# Patient Record
Sex: FEMALE | Race: WHITE | URBAN - METROPOLITAN AREA
[De-identification: names, ages, dates, MRNs, and addresses within clinical notes are randomized per-mention and may not be internally consistent; named-entity substitution may affect disease eponyms.]

---

## 2017-08-31 ENCOUNTER — OFFICE VISIT (OUTPATIENT)
Dept: FAMILY MEDICINE CLINIC | Facility: CLINIC | Age: 62
End: 2017-08-31

## 2017-08-31 VITALS
TEMPERATURE: 98 F | DIASTOLIC BLOOD PRESSURE: 90 MMHG | BODY MASS INDEX: 28.95 KG/M2 | SYSTOLIC BLOOD PRESSURE: 148 MMHG | RESPIRATION RATE: 16 BRPM | HEART RATE: 74 BPM | HEIGHT: 66.5 IN | WEIGHT: 182.25 LBS

## 2017-08-31 DIAGNOSIS — Z00.00 HEALTH CARE MAINTENANCE: Primary | ICD-10-CM

## 2017-08-31 DIAGNOSIS — E78.5 HYPERLIPIDEMIA, UNSPECIFIED HYPERLIPIDEMIA TYPE: ICD-10-CM

## 2017-08-31 PROBLEM — E78.49 OTHER HYPERLIPIDEMIA: Status: ACTIVE | Noted: 2017-08-31

## 2017-08-31 PROCEDURE — 99396 PREV VISIT EST AGE 40-64: CPT | Performed by: FAMILY MEDICINE

## 2017-08-31 RX ORDER — CLOBETASOL PROPIONATE 0.5 MG/G
OINTMENT TOPICAL AS NEEDED
COMMUNITY
Start: 2016-12-12 | End: 2019-05-20

## 2017-08-31 RX ORDER — VALACYCLOVIR HYDROCHLORIDE 500 MG/1
1 TABLET, FILM COATED ORAL AS NEEDED
COMMUNITY
Start: 2007-12-20 | End: 2021-11-03

## 2017-08-31 RX ORDER — ATORVASTATIN CALCIUM 10 MG/1
10 TABLET, FILM COATED ORAL NIGHTLY
Qty: 30 TABLET | Refills: 2 | Status: SHIPPED | OUTPATIENT
Start: 2017-08-31 | End: 2017-11-03

## 2017-08-31 NOTE — PROGRESS NOTES
[facility] [place]  PROGRESS NOTE  Chief Complaint:   Patient presents with:  Physical      HPI:   This is a 59year old female coming in for annual health check. Overall the patient feels well. She has no specific complaints of.   Over the pas tablet Rfl: 2      Counseling given: Not Answered       REVIEW OF SYSTEMS:   CONSTITUTIONAL:  Denies unusual weight gain/loss,  EENT:  Eyes:  Denies eye pain, visual loss, blurred vision, double vision or yellow sclerae.  Ears, Nose, Throat:  Denies hearing CLAD, no JVD, no thyromegaly. SKIN: No rashes, no skin lesion, no bruising, good turgor. HEART:  Regular rate and rhythm, no murmurs, rubs or gallops. LUNGS: Clear to auscultation bilterally, no rales/rhonchi/wheezing. CHEST: No tenderness.   ABDOMEN: Other hyperlipidemia      Yazmin Rogers MD  8/31/2017  11:36 AM

## 2017-08-31 NOTE — PATIENT INSTRUCTIONS
Continue with healthy lifestyle and exercise. Will start atorvastatin 10 mg daily. Recheck fasting blood test in 2 months.

## 2017-11-02 ENCOUNTER — APPOINTMENT (OUTPATIENT)
Dept: LAB | Age: 62
End: 2017-11-02
Attending: FAMILY MEDICINE
Payer: COMMERCIAL

## 2017-11-02 DIAGNOSIS — E78.5 HYPERLIPIDEMIA, UNSPECIFIED HYPERLIPIDEMIA TYPE: ICD-10-CM

## 2017-11-02 PROCEDURE — 80061 LIPID PANEL: CPT | Performed by: FAMILY MEDICINE

## 2017-11-02 PROCEDURE — 80053 COMPREHEN METABOLIC PANEL: CPT | Performed by: FAMILY MEDICINE

## 2017-11-02 PROCEDURE — 36415 COLL VENOUS BLD VENIPUNCTURE: CPT | Performed by: FAMILY MEDICINE

## 2017-11-03 ENCOUNTER — TELEPHONE (OUTPATIENT)
Dept: FAMILY MEDICINE CLINIC | Facility: CLINIC | Age: 62
End: 2017-11-03

## 2017-11-03 RX ORDER — ATORVASTATIN CALCIUM 10 MG/1
10 TABLET, FILM COATED ORAL NIGHTLY
Qty: 90 TABLET | Refills: 1 | Status: SHIPPED | OUTPATIENT
Start: 2017-11-03 | End: 2017-11-22

## 2017-11-03 NOTE — TELEPHONE ENCOUNTER
Informed pt of her blood work results. Pt expressed understanding. OK for refill per Dr. Kaiser Quintanilla for 3 months. Pt will call back to schedule 6 month blood work.

## 2017-11-03 NOTE — TELEPHONE ENCOUNTER
----- Message from Patrica Barry MD sent at 11/3/2017  7:19 AM CDT -----  With the addition of atorvastatin 10 mg daily, cholesterol level has decreased nicely. Total cholesterol now is 206, was 295. LDL is 93, was 182.   With the LDL being lower than

## 2017-11-22 RX ORDER — ATORVASTATIN CALCIUM 10 MG/1
TABLET, FILM COATED ORAL
Qty: 90 TABLET | Refills: 1 | Status: SHIPPED | OUTPATIENT
Start: 2017-11-22 | End: 2018-05-30

## 2017-11-22 NOTE — TELEPHONE ENCOUNTER
Future appt:    Last Appointment:  8/31/2017    Cholesterol, Total (mg/dL)   Date Value   11/02/2017 206 (H)   ----------  HDL Cholesterol (mg/dL)   Date Value   11/02/2017 89   ----------  LDL Cholesterol (mg/dL)   Date Value   11/02/2017 93   ----------

## 2018-05-30 RX ORDER — ATORVASTATIN CALCIUM 10 MG/1
TABLET, FILM COATED ORAL
Qty: 90 TABLET | Refills: 0 | Status: SHIPPED | OUTPATIENT
Start: 2018-05-30 | End: 2018-05-31

## 2018-05-30 NOTE — TELEPHONE ENCOUNTER
Future appt:    Last Appointment:  8/31/17 with Dr Shaw Doss for physical and other health issues  Return in 1 year  11/22/17 med last refilled      Cholesterol, Total (mg/dL)   Date Value   11/02/2017 206 (H)   ----------  HDL Cholesterol (mg/dL)   Date Va

## 2018-05-31 ENCOUNTER — TELEPHONE (OUTPATIENT)
Dept: FAMILY MEDICINE CLINIC | Facility: CLINIC | Age: 63
End: 2018-05-31

## 2018-05-31 RX ORDER — ATORVASTATIN CALCIUM 10 MG/1
TABLET, FILM COATED ORAL
Qty: 90 TABLET | Refills: 0 | Status: SHIPPED | OUTPATIENT
Start: 2018-05-31 | End: 2018-09-05

## 2018-05-31 NOTE — TELEPHONE ENCOUNTER
Future appt:    Last Appointment:  Visit date not found    Cholesterol, Total (mg/dL)   Date Value   11/02/2017 206 (H)   ----------  HDL Cholesterol (mg/dL)   Date Value   11/02/2017 89   ----------  LDL Cholesterol (mg/dL)   Date Value   11/02/2017 93

## 2018-05-31 NOTE — TELEPHONE ENCOUNTER
RF  Atrovastatin  10mg     to   [facility]   ( no available RFs ) please call patient back

## 2018-06-04 ENCOUNTER — TELEPHONE (OUTPATIENT)
Dept: FAMILY MEDICINE CLINIC | Facility: CLINIC | Age: 63
End: 2018-06-04

## 2018-06-04 NOTE — TELEPHONE ENCOUNTER
Pt will have her blood work done in August.    Pt was wondering why she only go 30 tabs. I informed pt I don't know why we sent in a script for 90. Pt will call the pharmacy and her insurance to see why she only go 30 tabs.

## 2018-06-04 NOTE — TELEPHONE ENCOUNTER
Patient was seen August 31, 2017 for general health check. 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   If she is planning on having the same blood test done again this year through the school district, then she woul

## 2018-06-04 NOTE — TELEPHONE ENCOUNTER
Pt wants to know if she needs to schedule lab work in order to have prescription for Lipitor refilled.

## 2018-08-31 RX ORDER — ATORVASTATIN CALCIUM 10 MG/1
TABLET, FILM COATED ORAL
Qty: 90 TABLET | Refills: 0 | Status: SHIPPED | OUTPATIENT
Start: 2018-08-31 | End: 2019-05-07 | Stop reason: ALTCHOICE

## 2018-08-31 NOTE — TELEPHONE ENCOUNTER
Future appt:     Your appointments     Date & Time Appointment Department [facility])    Sep 05, 2018  3:00 PM CDT Physical - Established Patient with Gretta Story MD [address] [place] [address]        Nancy Sharma

## 2018-09-05 ENCOUNTER — OFFICE VISIT (OUTPATIENT)
Dept: FAMILY MEDICINE CLINIC | Facility: CLINIC | Age: 63
End: 2018-09-05
Payer: COMMERCIAL

## 2018-09-05 VITALS
RESPIRATION RATE: 18 BRPM | WEIGHT: 191.19 LBS | DIASTOLIC BLOOD PRESSURE: 84 MMHG | HEIGHT: 66.5 IN | BODY MASS INDEX: 30.36 KG/M2 | TEMPERATURE: 98 F | SYSTOLIC BLOOD PRESSURE: 148 MMHG | HEART RATE: 80 BPM

## 2018-09-05 DIAGNOSIS — Z00.00 HEALTH CARE MAINTENANCE: Primary | ICD-10-CM

## 2018-09-05 DIAGNOSIS — R73.9 HYPERGLYCEMIA: ICD-10-CM

## 2018-09-05 DIAGNOSIS — E78.5 HYPERLIPIDEMIA, UNSPECIFIED HYPERLIPIDEMIA TYPE: ICD-10-CM

## 2018-09-05 PROCEDURE — 99396 PREV VISIT EST AGE 40-64: CPT | Performed by: FAMILY MEDICINE

## 2018-09-05 NOTE — PROGRESS NOTES
[facility] [place]  PROGRESS NOTE  Chief Complaint:   Patient presents with:  Physical      HPI:   This is a 57year old female coming in for general health check. This patient was started on atorvastatin 10 mg last year for hyperlipidemia.   Ch have had a bad heart   • Hypertension Brother      Hyperlipidemia     Allergies:  No Known Allergies  Current Meds:    Current Outpatient Prescriptions:  ATORVASTATIN 10 MG Oral Tab TAKE 1 TABLET BY MOUTH EVERY NIGHT Disp: 90 tablet Rfl: 0   ValACYclovir H as calculated from the following:    Height as of this encounter: 66.5\". Weight as of this encounter: 191 lb 3.2 oz. Vital signs reviewed. Appears stated age, well groomed  Physical Exam:  GEN:  Patient is alert, awake and oriented, well developed, we Education: There are no barriers to learning. Medical education done. Outcome: Patient verbalizes understanding. Patient is notified to call with any questions, complications, allergies, or worsening or changing symptoms.   Patient is to call with any omari

## 2018-11-05 ENCOUNTER — APPOINTMENT (OUTPATIENT)
Dept: LAB | Age: 63
End: 2018-11-05
Attending: FAMILY MEDICINE
Payer: MEDICARE

## 2018-11-05 DIAGNOSIS — R73.9 HYPERGLYCEMIA: ICD-10-CM

## 2018-11-05 DIAGNOSIS — E78.5 HYPERLIPIDEMIA, UNSPECIFIED HYPERLIPIDEMIA TYPE: ICD-10-CM

## 2018-11-05 PROCEDURE — 83036 HEMOGLOBIN GLYCOSYLATED A1C: CPT

## 2018-11-05 PROCEDURE — 36415 COLL VENOUS BLD VENIPUNCTURE: CPT

## 2018-11-05 PROCEDURE — 80061 LIPID PANEL: CPT

## 2018-11-05 PROCEDURE — 80048 BASIC METABOLIC PNL TOTAL CA: CPT

## 2018-11-06 ENCOUNTER — TELEPHONE (OUTPATIENT)
Dept: FAMILY MEDICINE CLINIC | Facility: CLINIC | Age: 63
End: 2018-11-06

## 2018-11-06 NOTE — TELEPHONE ENCOUNTER
----- Message from Cholo Marsh MD sent at 11/6/2018  8:09 AM CST -----  Glucose is normal at 93 (was elevated on outside labs per) and A1c diabetic test is within normal range. This means there is no diabetes.   Since stopping atorvastatin 10 mg daily

## 2018-11-06 NOTE — TELEPHONE ENCOUNTER
Patient informed of below. Expressed understanding. Patient states Her Aches/Pain have subsided with stopping Atorvastin. Patient asking if there is an alternative statin she could try? She is dieting/exercising.   Sara Lambert, 11/06/18, 1:21 PM

## 2018-11-07 RX ORDER — EZETIMIBE 10 MG/1
10 TABLET ORAL NIGHTLY
Qty: 30 TABLET | Refills: 2 | Status: SHIPPED | OUTPATIENT
Start: 2018-11-07 | End: 2018-11-20

## 2018-11-07 NOTE — TELEPHONE ENCOUNTER
Tell her that I can prescribe Zetia 10 mg daily which is less effective than the statin group of medications and sometimes more expensive depending on insurance plans. Please send prescription for Zetia to pharmacy of choice and notify patient.   Then rech

## 2018-11-07 NOTE — TELEPHONE ENCOUNTER
Informed pt Dr. Ruth Reno is switching pt to Zetia. Pt will call if med is too expensive. Pt expressed understanding and thanks.

## 2018-11-20 NOTE — TELEPHONE ENCOUNTER
Patient requesting Rx for Zetia sent to Pharmacy. Leaving for [place]. Will be gone until April. Please send Zetia Rx to [address] [facility].   Kari Ventura, 11/20/18, 5:19 PM

## 2018-11-21 RX ORDER — EZETIMIBE 10 MG/1
10 TABLET ORAL NIGHTLY
Qty: 90 TABLET | Refills: 0 | Status: SHIPPED | OUTPATIENT
Start: 2018-11-21 | End: 2018-12-03

## 2018-12-03 RX ORDER — EZETIMIBE 10 MG/1
10 TABLET ORAL NIGHTLY
Qty: 90 TABLET | Refills: 0 | Status: SHIPPED | OUTPATIENT
Start: 2018-12-03 | End: 2019-04-05

## 2018-12-03 NOTE — TELEPHONE ENCOUNTER
Patient needs refill on her Ezetimibe 10mg medication to [address]. Hari Gann is leaving to [place] and won't be coming back until April. Wants to know if she can get refill until she comes back so she is able to refill at [place] without problems.  Pa

## 2018-12-03 NOTE — TELEPHONE ENCOUNTER
Patient leaving for [place]. Needs Zetia refilled to get her through April. Will get refills at [facility]. Patient is not having any side effects from Zetia. Has Appt scheduled with Dr Jax Peña in May. Please advise.   Efra River, 12/03/18, 11:23

## 2018-12-05 ENCOUNTER — TELEPHONE (OUTPATIENT)
Dept: FAMILY MEDICINE CLINIC | Facility: CLINIC | Age: 63
End: 2018-12-05

## 2018-12-05 NOTE — TELEPHONE ENCOUNTER
Tell patient that there is no other similar or alternative medication to ezetimibe (Zetia), other than statin medication which she did not tolerate due to myalgias.   In other words her insurance is implying using a statin medication, which she did not tole

## 2018-12-05 NOTE — TELEPHONE ENCOUNTER
Since pt is leaving for several month on Sunday. Pt will stay with the Zetia which is $25.00 per month. This med is working well for pt.

## 2019-04-05 RX ORDER — EZETIMIBE 10 MG/1
10 TABLET ORAL NIGHTLY
Qty: 90 TABLET | Refills: 0 | Status: SHIPPED | OUTPATIENT
Start: 2019-04-05 | End: 2019-05-20

## 2019-04-05 NOTE — TELEPHONE ENCOUNTER
Future Appointments   Date Time Provider Bebeto Aly   5/7/2019  9:00 AM Jcarlos Chambers MD [facility] [place]

## 2019-05-07 ENCOUNTER — HOSPITAL ENCOUNTER (OUTPATIENT)
Dept: BONE DENSITY | Age: 64
Discharge: HOME | End: 2019-05-07
Attending: FAMILY MEDICINE
Payer: MEDICARE

## 2019-05-07 ENCOUNTER — APPOINTMENT (OUTPATIENT)
Dept: LAB | Age: 64
End: 2019-05-07
Attending: FAMILY MEDICINE
Payer: MEDICARE

## 2019-05-07 ENCOUNTER — TELEPHONE (OUTPATIENT)
Dept: FAMILY MEDICINE CLINIC | Facility: CLINIC | Age: 64
End: 2019-05-07

## 2019-05-07 ENCOUNTER — OFFICE VISIT (OUTPATIENT)
Dept: FAMILY MEDICINE CLINIC | Facility: CLINIC | Age: 64
End: 2019-05-07
Payer: MEDICARE

## 2019-05-07 VITALS
DIASTOLIC BLOOD PRESSURE: 66 MMHG | TEMPERATURE: 98 F | HEART RATE: 78 BPM | WEIGHT: 192 LBS | OXYGEN SATURATION: 98 % | RESPIRATION RATE: 16 BRPM | BODY MASS INDEX: 29.78 KG/M2 | SYSTOLIC BLOOD PRESSURE: 134 MMHG | HEIGHT: 67.5 IN

## 2019-05-07 DIAGNOSIS — Z86.39 PERSONAL HISTORY OF ENDOCRINE DISORDER: ICD-10-CM

## 2019-05-07 DIAGNOSIS — L30.9 DERMATITIS: ICD-10-CM

## 2019-05-07 DIAGNOSIS — E78.49 OTHER HYPERLIPIDEMIA: ICD-10-CM

## 2019-05-07 DIAGNOSIS — B00.9 HERPES SIMPLEX: ICD-10-CM

## 2019-05-07 DIAGNOSIS — R73.09 ELEVATED GLUCOSE: ICD-10-CM

## 2019-05-07 DIAGNOSIS — Z00.00 ENCOUNTER FOR ANNUAL HEALTH EXAMINATION: Primary | ICD-10-CM

## 2019-05-07 DIAGNOSIS — Z78.0 ASYMPTOMATIC POSTMENOPAUSAL STATUS (AGE-RELATED) (NATURAL): ICD-10-CM

## 2019-05-07 DIAGNOSIS — Z23 NEED FOR VACCINATION: ICD-10-CM

## 2019-05-07 DIAGNOSIS — Z11.59 NEED FOR HEPATITIS C SCREENING TEST: ICD-10-CM

## 2019-05-07 PROCEDURE — G0403 EKG FOR INITIAL PREVENT EXAM: HCPCS | Performed by: FAMILY MEDICINE

## 2019-05-07 PROCEDURE — 85025 COMPLETE CBC W/AUTO DIFF WBC: CPT | Performed by: FAMILY MEDICINE

## 2019-05-07 PROCEDURE — 83036 HEMOGLOBIN GLYCOSYLATED A1C: CPT | Performed by: FAMILY MEDICINE

## 2019-05-07 PROCEDURE — 81003 URINALYSIS AUTO W/O SCOPE: CPT | Performed by: FAMILY MEDICINE

## 2019-05-07 PROCEDURE — 77080 DXA BONE DENSITY AXIAL: CPT | Performed by: FAMILY MEDICINE

## 2019-05-07 PROCEDURE — 80061 LIPID PANEL: CPT | Performed by: FAMILY MEDICINE

## 2019-05-07 PROCEDURE — G0402 INITIAL PREVENTIVE EXAM: HCPCS | Performed by: FAMILY MEDICINE

## 2019-05-07 PROCEDURE — 36415 COLL VENOUS BLD VENIPUNCTURE: CPT | Performed by: FAMILY MEDICINE

## 2019-05-07 PROCEDURE — 84443 ASSAY THYROID STIM HORMONE: CPT | Performed by: FAMILY MEDICINE

## 2019-05-07 PROCEDURE — 86803 HEPATITIS C AB TEST: CPT | Performed by: FAMILY MEDICINE

## 2019-05-07 PROCEDURE — 80053 COMPREHEN METABOLIC PANEL: CPT | Performed by: FAMILY MEDICINE

## 2019-05-07 NOTE — PROGRESS NOTES
CC: Annual Physical Exam    HPI:   Amado Crawford is a 70year old female who presents for a complete physical exam.    Patient new to medicare- here for health check  Previous pat of RF  Parathyroidectomy-- years ago-- elevated calcium- resolved and TSH 2.740 0.358 - 3.740 mIU/mL   URINALYSIS WITH CULTURE REFLEX   Result Value Ref Range    Urine Color Straw Yellow    Clarity Urine Clear Clear    Spec Gravity 1.006 1.001 - 1.030    Glucose Urine Negative Negative mg/dl    Bilirubin Urine Negative Ne Allergies   Past Medical History:   Diagnosis Date   • History of hyperparathyroidism     2007 with  parathyroidectomy   • Other hyperlipidemia 8/31/2017      Past Surgical History:   Procedure Laterality Date   • OTHER      Thoracotomy for benign lesion i help    Toileting: Able without help    Dressing: Able without help    Eating: Able without help    Driving: Able without help    Preparing your meals: Able without help    Managing money/bills: Able without help    Taking medications as prescribed: Able w chest pain, chest pressure, chest discomfort, palpitations, edema, dyspnea on exertion or at rest.  RESPIRATORY:  Denies shortness of breath, wheezing, cough or sputum.   GASTROINTESTINAL:  Denies abdominal pain, nausea, vomiting, constipation, diarrhea, or no rales/rhonchi/wheezing. BREAST: No skin changes, no palpable abnormality bilaterally  CHEST: No tenderness. ABDOMEN:  Soft, nondistended, nontender,no masses, no hepatosplenomegaly  BACK: No tenderness, no spasm, SLR test negative, FROM.   : Deferred status (age-related) (natural)  General screening advised  - XR DEXA BONE DENSITOMETRY (CPT=77080); Future    6. Herpes simplex  As needed use of Valtrex    7.  Personal history of endocrine disorder  Thyroid abnormality patient with yearly calcium level ch for Diabetics HgbA1C (%)   Date Value   11/05/2018 5.4    No flowsheet data found.     Fasting Blood Sugar (FSB)   Patient must be diagnosed with one of the following:   • Hypertension   • Dyslipidemia   • Obesity (BMI ³30 kg/m2)   • Previous elevated impai uncomfortable   Glaucoma Screening      Ophthalmology Visit   Covered annually for Diabetics, people with Glaucoma family history,   Americans over age 48   Americans over age 72 No flowsheet data found.  OK to schedule if you are in this ris retarded   Persons who live in the same house as a HepB virus carrier   Homosexual men   Illicit injectable drug abusers     Tetanus Toxoid- Only covered with a cut with metal- TD and TDaP Not covered by Medicare Part B) No orders found for this or any pre

## 2019-05-07 NOTE — PATIENT INSTRUCTIONS
Baseline EKG today    D/w patient pneumonia vaccine- pt declined    rec bone density scan ( dexa) can be done here    rec colon screening-- colonoscopy or cologuard or stool cards    Fasting labs today    Yearly mammogram recommended    Continue present me Routine EKG is not a screening covered service except at the Welcome to Medicare Visit    Abdominal aortic aneurysm screening (once between ages 73-68) IPPE only No results found for this or any previous visit.  Limited to patients who meet one of the maria antoniao CHLAMYDIA No flowsheet data found.     Screening Mammogram      Mammogram    Recommend Annually to at least age 76, and as needed after 76 Mammogram due on 10/31/1955 Please get this Mammogram regularly   Immunizations      Influenza  Covered Annually No or printer. (the forms are also available in [address])  www. [email or web address]. org  This link also has information from the [facility] regarding Advance Directives.

## 2019-05-07 NOTE — TELEPHONE ENCOUNTER
----- Message from Nataliia Castellano MD sent at 5/7/2019  3:29 PM CDT -----  Bone density scan reviewed.     Finding:  NORMAL and reassuring-- repeat 5 years      Patient encouraged to engage in weightbearing exercises ideally walking and biking    All pat

## 2019-05-08 ENCOUNTER — TELEPHONE (OUTPATIENT)
Dept: FAMILY MEDICINE CLINIC | Facility: CLINIC | Age: 64
End: 2019-05-08

## 2019-05-08 DIAGNOSIS — R73.09 ELEVATED GLUCOSE: Primary | ICD-10-CM

## 2019-05-08 NOTE — TELEPHONE ENCOUNTER
----- Message from Bryce Marroquin MD sent at 5/8/2019 10:38 AM CDT -----  Normal hgba1c-- pt to monitor diet, alcohol intake ( will raise glucose also) and continue walking.

## 2019-05-08 NOTE — TELEPHONE ENCOUNTER
----- Message from Donya Cloud MD sent at 5/8/2019  8:12 AM CDT -----  Lab results reviewed  Hep C screen negative  Total cholesterol and LDL with some elevation, improved compared to previous. Encourage diet and exercise management.   Chemistry- lorenzo

## 2019-05-20 ENCOUNTER — TELEPHONE (OUTPATIENT)
Dept: FAMILY MEDICINE CLINIC | Facility: CLINIC | Age: 64
End: 2019-05-20

## 2019-05-20 RX ORDER — CLOBETASOL PROPIONATE 0.5 MG/G
1 OINTMENT TOPICAL 2 TIMES DAILY PRN
Qty: 30 G | Refills: 3 | Status: SHIPPED | OUTPATIENT
Start: 2019-05-20

## 2019-05-20 RX ORDER — EZETIMIBE 10 MG/1
10 TABLET ORAL NIGHTLY
Qty: 90 TABLET | Refills: 3 | Status: SHIPPED | OUTPATIENT
Start: 2019-05-20 | End: 2020-03-23

## 2019-05-20 NOTE — TELEPHONE ENCOUNTER
Future appt:    Last Appointment:  5/7/2019 ([address])    Pt states she needs Ezetimibe for 90 day supply and Clobetasol ointment (30 gram) sent to mail order pharmacy, [facility]        Cholesterol, Total (mg/dL)   Date Value   05/07/2019 226 (H)     HDL C

## 2019-05-20 NOTE — TELEPHONE ENCOUNTER
Patient states she is going to start using [facility] for her medication refills, states she was told that the nurse has to call them to authorize future refills.  # [phone]

## 2020-03-23 ENCOUNTER — TELEPHONE (OUTPATIENT)
Dept: FAMILY MEDICINE CLINIC | Facility: CLINIC | Age: 65
End: 2020-03-23

## 2020-03-23 RX ORDER — EZETIMIBE 10 MG/1
10 TABLET ORAL NIGHTLY
Qty: 90 TABLET | Refills: 1 | Status: SHIPPED | OUTPATIENT
Start: 2020-03-23 | End: 2020-09-18

## 2020-03-23 NOTE — TELEPHONE ENCOUNTER
Future appt:    Last Appointment with provider:   5/7/20 Physical. Follow up annually.   Last appointment at [facility]:  Visit date not found  Cholesterol, Total (mg/dL)   Date Value   05/07/2019 226 (H)     HDL Cholesterol (mg/dL)   Date Value   05/07/2

## 2020-03-23 NOTE — TELEPHONE ENCOUNTER
Re:  still in [place] - needs a Rx refill   -  Ezetimibe  10 mg   Would like at least 90 days    - Call into  [facility]   [phone]

## 2020-09-18 ENCOUNTER — TELEPHONE (OUTPATIENT)
Dept: FAMILY MEDICINE CLINIC | Facility: CLINIC | Age: 65
End: 2020-09-18

## 2020-09-18 DIAGNOSIS — E78.49 OTHER HYPERLIPIDEMIA: ICD-10-CM

## 2020-09-18 DIAGNOSIS — Z00.00 ANNUAL PHYSICAL EXAM: Primary | ICD-10-CM

## 2020-09-18 DIAGNOSIS — R73.09 ELEVATED GLUCOSE: ICD-10-CM

## 2020-09-18 RX ORDER — EZETIMIBE 10 MG/1
10 TABLET ORAL NIGHTLY
Qty: 90 TABLET | Refills: 0 | Status: SHIPPED | OUTPATIENT
Start: 2020-09-18 | End: 2020-11-02

## 2020-09-18 NOTE — TELEPHONE ENCOUNTER
RF Ezetimibe 10mg   to    [place] [facility]     ( has px in oct but needs RF prior )    call to confirm this has been sent         Future Appointments   Date Time Provider Bebeto Aly   10/14/2020  9:00 AM REF [place] REF [facility] [id number] [facility] Ref Syc   10/19/2020

## 2020-09-18 NOTE — TELEPHONE ENCOUNTER
Medicare px   10/19   w/ FBW on 10/14    need BW orders please. ..        OTW      Future Appointments   Date Time Provider Bebeto Aly   10/14/2020  9:00 AM REF [place] REF [facility] Ref Syc   10/19/2020 11:30 AM Katerin West MD [facility] [place] [facility]

## 2020-09-18 NOTE — TELEPHONE ENCOUNTER
Pt last seen for px and labs May 2019. Pt scheduled to come in next month. Pt states she has a couple weeks left of medication. Pt states she also would like to have lab orders placed for her lab appt.     Pt was informed that CR will be out of offic

## 2020-10-14 ENCOUNTER — LAB ENCOUNTER (OUTPATIENT)
Dept: LAB | Age: 65
End: 2020-10-14
Attending: FAMILY MEDICINE
Payer: MEDICARE

## 2020-10-14 DIAGNOSIS — E78.49 OTHER HYPERLIPIDEMIA: ICD-10-CM

## 2020-10-14 DIAGNOSIS — Z00.00 ANNUAL PHYSICAL EXAM: ICD-10-CM

## 2020-10-14 DIAGNOSIS — R73.09 ELEVATED GLUCOSE: ICD-10-CM

## 2020-10-14 PROCEDURE — 85025 COMPLETE CBC W/AUTO DIFF WBC: CPT

## 2020-10-14 PROCEDURE — 36415 COLL VENOUS BLD VENIPUNCTURE: CPT

## 2020-10-14 PROCEDURE — 80061 LIPID PANEL: CPT

## 2020-10-14 PROCEDURE — 81003 URINALYSIS AUTO W/O SCOPE: CPT

## 2020-10-14 PROCEDURE — 83036 HEMOGLOBIN GLYCOSYLATED A1C: CPT

## 2020-10-14 PROCEDURE — 84443 ASSAY THYROID STIM HORMONE: CPT

## 2020-10-14 PROCEDURE — 80053 COMPREHEN METABOLIC PANEL: CPT

## 2020-10-14 PROCEDURE — 84439 ASSAY OF FREE THYROXINE: CPT

## 2020-11-02 ENCOUNTER — OFFICE VISIT (OUTPATIENT)
Dept: FAMILY MEDICINE CLINIC | Facility: CLINIC | Age: 65
End: 2020-11-02
Payer: MEDICARE

## 2020-11-02 VITALS
HEIGHT: 67.75 IN | SYSTOLIC BLOOD PRESSURE: 160 MMHG | TEMPERATURE: 97 F | WEIGHT: 164.38 LBS | BODY MASS INDEX: 25.2 KG/M2 | RESPIRATION RATE: 16 BRPM | DIASTOLIC BLOOD PRESSURE: 90 MMHG | HEART RATE: 72 BPM

## 2020-11-02 DIAGNOSIS — Z13.31 DEPRESSION SCREENING: ICD-10-CM

## 2020-11-02 DIAGNOSIS — Z00.00 ENCOUNTER FOR ANNUAL HEALTH EXAMINATION: ICD-10-CM

## 2020-11-02 DIAGNOSIS — L30.9 DERMATITIS: ICD-10-CM

## 2020-11-02 DIAGNOSIS — B00.9 HERPES SIMPLEX: ICD-10-CM

## 2020-11-02 DIAGNOSIS — E78.49 OTHER HYPERLIPIDEMIA: Primary | ICD-10-CM

## 2020-11-02 DIAGNOSIS — L57.8 SUN-DAMAGED SKIN: ICD-10-CM

## 2020-11-02 PROCEDURE — G0444 DEPRESSION SCREEN ANNUAL: HCPCS | Performed by: FAMILY MEDICINE

## 2020-11-02 PROCEDURE — G0438 PPPS, INITIAL VISIT: HCPCS | Performed by: FAMILY MEDICINE

## 2020-11-02 RX ORDER — EZETIMIBE 10 MG/1
10 TABLET ORAL NIGHTLY
Qty: 90 TABLET | Refills: 3 | Status: SHIPPED | OUTPATIENT
Start: 2020-11-02 | End: 2021-01-25

## 2020-11-02 NOTE — PATIENT INSTRUCTIONS
rec continue cholesterol medication    Continue healthy diet and exercise    Recheck yearly and as needed    Consider Tdap vaccine in  Future    rec mammogram next year        [address]   Tests on this list are recommended b old and have smoked more than 100 cigarettes in their lifetime   • Anyone with a family history    Colorectal Cancer Screening  Covered up to Age 76     Colonoscopy Screen   Covered every 10 years- more often if abnormal Colonoscopy due on 01/29/2020 Josefinaat get every year    Pneumococcal 13 (Prevnar)  Covered Once after 65 Orders placed or performed in visit on 05/07/19   • PNEUMOCOCCAL VACC, 13 ABELINO IM    Please get once after your 65th birthday    Pneumococcal 23 (Pneumovax)  Covered Once after 65 No orders

## 2020-11-02 NOTE — PROGRESS NOTES
CC: Annual Physical Exam    HPI:   Jaswinder Hu is a 77year old female who presents for a complete physical exam.  Patient tried to follow a healthier diet and exercise regularly.   She walks quite a bit she also has a pool and swims most of the hendrickson 3.740 mIU/mL   URINALYSIS WITH CULTURE REFLEX    Specimen: Urine   Result Value Ref Range    Urine Color Yellow Yellow    Clarity Urine Clear Clear    Spec Gravity 1.016 1.001 - 1.030    Glucose Urine Negative Negative mg/dl    Bilirubin Urine Negative Neg • Ca Phosphate-Cholecalciferol (CALCIUM 500 + D3) 250-500 MG-UNIT Oral Chew Tab Chew 1 tablet by mouth daily. • ValACYclovir HCl (VALTREX) 500 MG Oral Tab Take 1 tablet by mouth as needed.         No Known Allergies   Past Medical History:   Diagnos you maintain positive mental well-being?: Social Interaction; Visiting Family;Puzzles;Games; Visiting Friends    If you are a male age 38-65 or a female age 47-67, do you take aspirin?: No    Have you had any immunizations at another office such as Influenza double vision or yellow sclerae. Ears, Nose, Throat:  Denies hearing loss, sneezing, congestion, runny nose or sore throat. INTEGUMENTARY:  Denies rashes, itching, skin lesion, or excessive skin dryness.   CARDIOVASCULAR:  Denies chest pain, chest pressure lesions or ulcerations, good dentition. NECK: Supple, no JVD, no carotid bruit, no thyromegaly. SKIN: No rashes, no skin lesion, no bruising, good turgor.-Sun damaged skin changes anterior chest especially no melanotic lesions noted.   HEART:  Regular rat needed    Consider Tdap vaccine in  Future    rec mammogram next year        [address]   Tests on this list are recommended by your physician but may not be covered, or covered at this frequency, by your insurer.  Please chec Colorectal Cancer Screening  Covered up to Age 76     Colonoscopy Screen   Covered every 10 years- more often if abnormal Colonoscopy due on 01/29/2020 Update Health Maintenance if applicable    Flex Sigmoidoscopy Screen  Covered every 5 years No results visit on 05/07/19   • PNEUMOCOCCAL VACC, 13 ABELINO IM    Please get once after your 65th birthday    Pneumococcal 23 (Pneumovax)  Covered Once after 65 No orders found for this or any previous visit.  Please get once after your 65th birthday    Hepatitis B for agrees to the plan. Meds & Refills for this Visit:  Requested Prescriptions     Signed Prescriptions Disp Refills   • ezetimibe 10 MG Oral Tab 90 tablet 3     Sig: Take 1 tablet (10 mg total) by mouth nightly.        Imaging & Consults:  None    The

## 2020-11-20 NOTE — TELEPHONE ENCOUNTER
RE;  new RX for chols  ( leaving for [facility] for the winter 12/6)      does she need BW?   f/up ?   Please advise    OTW til Wed 11/21 0

## 2021-01-25 ENCOUNTER — TELEPHONE (OUTPATIENT)
Dept: FAMILY MEDICINE CLINIC | Facility: CLINIC | Age: 66
End: 2021-01-25

## 2021-01-25 RX ORDER — EZETIMIBE 10 MG/1
10 TABLET ORAL NIGHTLY
Qty: 90 TABLET | Refills: 1 | Status: SHIPPED | OUTPATIENT
Start: 2021-01-25 | End: 2021-04-16

## 2021-01-25 NOTE — TELEPHONE ENCOUNTER
Future appt:    Last Appointment with provider:   11/2/2020  Last appointment at [facility]:  11/2/2020- [address]- pt as asked to return yearly and prn    Ezetimibe refilled on 11/2/20 for one year to [address].     Pt contacted-  Pt currently in [place] - pt is ask

## 2021-02-24 ENCOUNTER — PATIENT OUTREACH (OUTPATIENT)
Dept: FAMILY MEDICINE CLINIC | Facility: CLINIC | Age: 66
End: 2021-02-24

## 2021-04-16 RX ORDER — EZETIMIBE 10 MG/1
10 TABLET ORAL NIGHTLY
Qty: 90 TABLET | Refills: 2 | Status: SHIPPED | OUTPATIENT
Start: 2021-04-16 | End: 2021-11-04

## 2021-04-16 NOTE — TELEPHONE ENCOUNTER
Future appt:    Last Appointment with provider:   11/2/2020  Last appointment at [facility]:  11/2/2020- [address]- pt was advised to return yearly and prn    Ezetimibe refilled on 1/25/21 for #90 with 1 refill  Spoke with pt.   Pt would like to change pharmacy

## 2021-04-16 NOTE — TELEPHONE ENCOUNTER
Patient states that she is low on her Ezetimibe 10mg, would like Dr Shen Luong to send refill request to [address] [place] [address] Dr mail order asap.

## 2021-10-08 ENCOUNTER — TELEPHONE (OUTPATIENT)
Dept: FAMILY MEDICINE CLINIC | Facility: CLINIC | Age: 66
End: 2021-10-08

## 2021-10-08 DIAGNOSIS — E78.49 OTHER HYPERLIPIDEMIA: Primary | ICD-10-CM

## 2021-10-08 DIAGNOSIS — Z00.00 ENCOUNTER FOR ANNUAL HEALTH EXAMINATION: ICD-10-CM

## 2021-10-08 NOTE — TELEPHONE ENCOUNTER
pt is requesting labs before she leaves for 4 months- wanted to schedule medicare wellness exam but will be gone by the time it's due- is on cholesterol meds and would like to get blood work  done so she can have her meds

## 2021-10-08 NOTE — TELEPHONE ENCOUNTER
Lab orders entered  Future Appointments   Date Time Provider Bebeto Aly   10/19/2021  9:00 AM REF [place] RE[facility] Ref Syc   10/25/2021  9:30 AM MYCHAL Garcia [facility] [place] [facility]

## 2021-10-29 ENCOUNTER — LABORATORY ENCOUNTER (OUTPATIENT)
Dept: LAB | Age: 66
End: 2021-10-29
Attending: FAMILY MEDICINE
Payer: MEDICARE

## 2021-10-29 DIAGNOSIS — R73.9 BLOOD GLUCOSE ELEVATED: ICD-10-CM

## 2021-10-29 DIAGNOSIS — E78.49 OTHER HYPERLIPIDEMIA: ICD-10-CM

## 2021-10-29 DIAGNOSIS — Z00.00 ENCOUNTER FOR ANNUAL HEALTH EXAMINATION: ICD-10-CM

## 2021-10-29 PROCEDURE — 84443 ASSAY THYROID STIM HORMONE: CPT

## 2021-10-29 PROCEDURE — 80061 LIPID PANEL: CPT

## 2021-10-29 PROCEDURE — 80053 COMPREHEN METABOLIC PANEL: CPT

## 2021-10-29 PROCEDURE — 36415 COLL VENOUS BLD VENIPUNCTURE: CPT

## 2021-10-29 PROCEDURE — 85025 COMPLETE CBC W/AUTO DIFF WBC: CPT

## 2021-10-29 PROCEDURE — 83036 HEMOGLOBIN GLYCOSYLATED A1C: CPT

## 2021-10-29 PROCEDURE — 81003 URINALYSIS AUTO W/O SCOPE: CPT

## 2021-10-29 PROCEDURE — 84439 ASSAY OF FREE THYROXINE: CPT

## 2021-10-30 ENCOUNTER — TELEPHONE (OUTPATIENT)
Dept: FAMILY MEDICINE CLINIC | Facility: CLINIC | Age: 66
End: 2021-10-30

## 2021-10-30 DIAGNOSIS — R73.9 BLOOD GLUCOSE ELEVATED: Primary | ICD-10-CM

## 2021-10-30 NOTE — TELEPHONE ENCOUNTER
----- Message from MYCHAL Nathan sent at 10/30/2021  7:56 AM CDT -----  Please make sure patient receives my chart message as below-  Dr. Trish Cerrato is out of the office-I reviewed your blood work-your blood sugar is slightly elevated at 111-I w

## 2021-11-01 ENCOUNTER — TELEPHONE (OUTPATIENT)
Dept: FAMILY MEDICINE CLINIC | Facility: CLINIC | Age: 66
End: 2021-11-01

## 2021-11-01 NOTE — TELEPHONE ENCOUNTER
----- Message from MYCHAL Fischer sent at 10/30/2021 12:26 PM CDT -----  Your hemoglobin A1c is 5.7-nondiabetic is less than 5.7-prediabetes is 5.7-6.4-diabetes is anything greater than 6.4.   This puts you just very slightly into the prediabetic ran

## 2021-11-02 ENCOUNTER — TELEPHONE (OUTPATIENT)
Dept: FAMILY MEDICINE CLINIC | Facility: CLINIC | Age: 66
End: 2021-11-02

## 2021-11-02 DIAGNOSIS — R92.8 ABNORMAL MAMMOGRAM: Primary | ICD-10-CM

## 2021-11-02 NOTE — TELEPHONE ENCOUNTER
Mammogram screening completed at [facility] on 11/2/21-    pt needs additional imaging on left breast    Pt was informed.     Routed to EL covering for CR - will need order for additional imaging

## 2021-11-02 NOTE — TELEPHONE ENCOUNTER
FINDINGS:   The breasts are heterogeneously dense, which may obscure small masses. Left   There are punctate calcifications in a grouped distribution seen in the   central region of the left breast at 3 o'clock. This is a new finding.      Right   There

## 2021-11-03 ENCOUNTER — HOSPITAL ENCOUNTER (OUTPATIENT)
Dept: BONE DENSITY | Age: 66
Discharge: HOME | End: 2021-11-03
Attending: NURSE PRACTITIONER
Payer: MEDICARE

## 2021-11-03 ENCOUNTER — OFFICE VISIT (OUTPATIENT)
Dept: FAMILY MEDICINE CLINIC | Facility: CLINIC | Age: 66
End: 2021-11-03
Payer: MEDICARE

## 2021-11-03 VITALS
SYSTOLIC BLOOD PRESSURE: 128 MMHG | TEMPERATURE: 97 F | OXYGEN SATURATION: 98 % | HEIGHT: 68 IN | WEIGHT: 158.19 LBS | RESPIRATION RATE: 14 BRPM | BODY MASS INDEX: 23.97 KG/M2 | DIASTOLIC BLOOD PRESSURE: 88 MMHG | HEART RATE: 75 BPM

## 2021-11-03 DIAGNOSIS — Z78.0 POSTMENOPAUSAL: ICD-10-CM

## 2021-11-03 DIAGNOSIS — Z13.820 SCREENING FOR OSTEOPOROSIS: ICD-10-CM

## 2021-11-03 DIAGNOSIS — H61.899 LESION OF EAR CANAL: ICD-10-CM

## 2021-11-03 DIAGNOSIS — Z00.00 ENCOUNTER FOR ANNUAL HEALTH EXAMINATION: Primary | ICD-10-CM

## 2021-11-03 PROCEDURE — G0439 PPPS, SUBSEQ VISIT: HCPCS | Performed by: NURSE PRACTITIONER

## 2021-11-03 PROCEDURE — 77080 DXA BONE DENSITY AXIAL: CPT | Performed by: NURSE PRACTITIONER

## 2021-11-03 RX ORDER — VALACYCLOVIR HYDROCHLORIDE 500 MG/1
500 TABLET, FILM COATED ORAL AS NEEDED
Qty: 60 TABLET | Refills: 5 | Status: SHIPPED | OUTPATIENT
Start: 2021-11-03 | End: 2021-11-05

## 2021-11-03 NOTE — TELEPHONE ENCOUNTER
Order faxed to [facility] patient scheduling. See note below from Vaibhav. Patient was already informed.

## 2021-11-03 NOTE — PROGRESS NOTES
HPI:   Rashaad is a 74year old female who presents for a Medicare Subsequent Annual Wellness visit (Pt already had Initial Annual Wellness). Annual Physical due on 11/03/2022          She has been screened for Falls and is low risk.     C Labs:   Lab Results   Component Value Date    MJANXAA 915 10/29/2021    HDL 86 (H) 10/29/2021     (H) 10/29/2021    TRIG 49 10/29/2021          Last Chemistry Labs:   Lab Results   Component Value Date    AST 16 10/29/2021    ALT 24 10/29/2021    CA chest pain on exertion  GI: denies abdominal pain, denies heartburn  : denies dysuria, vaginal discharge or itching, no complaint of urinary incontinence   MUSCULOSKELETAL: denies back pain  NEURO: denies headaches  PSYCHE: denies depression or anxiety visit:    Encounter for annual health examination    Screening for osteoporosis    Postmenopausal  -     XR DEXA BONE DENSITOMETRY (CPT=77080); Future    Lesion of ear canal    Other orders  -     valACYclovir 500 MG Oral Tab;  Take 1 tablet (500 mg total) HDL 86 (H) 10/29/2021     (H) 10/29/2021    TRIG 49 10/29/2021         Electrocardiogram (EKG)   Covered if needed at Welcome to Medicare, and non-screening if indicated for medical reasons 05/10/2019      Ultrasound Screening for Abdominal Aortic A unless medically necessary (cut with metal); may be covered with your pharmacy prescription benefits -    Tetanus, Diptheria and Pertusis TD and TDaP Not covered by Medicare Part B -  No recommendations at this time    Zoster Not covered by Medicare Part B

## 2021-11-03 NOTE — PATIENT INSTRUCTIONS
Recommend bone density scan   - you can schedule that here     Recommend Dermatology for skin check     Recommend ENT to evaluate your Ears -     Recommend Shingles, flu, pneumonia, Covid, vaccine    Check on colonoscopy -  Due in 2020?       Continue medic Blood Test Covered annually -   Bone Density Screening    Bone density screening    Covered every 2 years after age 72 if diagnosed with risk of osteoporosis or estrogen deficiency.     Covered yearly for long-term glucocorticoid medication use (Steroids) L Advance Directives. It also has the State forms available on it's website for anyone to review and print using their home computer and printer. (the forms are also available in [address])  www. putitinwriting. org  This link also has information from the [facility] [place] [facility] Occult Blood Test Covered annually -   Bone Density Screening    Bone density screening    Covered every 2 years after age 72 if diagnosed with risk of osteoporosis or estrogen deficiency.     Covered yearly for long-term glucocorticoid medication use (Ster types of Advance Directives. It also has the State forms available on it's website for anyone to review and print using their home computer and printer. (the forms are also available in [address])  www. Statuslywriting. org  This link also has information from Fecal Occult Blood Test Covered annually -   Bone Density Screening    Bone density screening    Covered every 2 years after age 72 if diagnosed with risk of osteoporosis or estrogen deficiency.     Covered yearly for long-term glucocorticoid medication different types of Advance Directives. It also has the State forms available on it's website for anyone to review and print using their home computer and printer. (the forms are also available in [address])  www. Powerspanwriting. org  This link also has informa

## 2021-11-04 ENCOUNTER — TELEPHONE (OUTPATIENT)
Dept: FAMILY MEDICINE CLINIC | Facility: CLINIC | Age: 66
End: 2021-11-04

## 2021-11-04 RX ORDER — EZETIMIBE 10 MG/1
10 TABLET ORAL NIGHTLY
Qty: 90 TABLET | Refills: 0 | Status: SHIPPED | OUTPATIENT
Start: 2021-11-04

## 2021-11-04 NOTE — TELEPHONE ENCOUNTER
----- Message from MYCHAL Quintero sent at 11/4/2021 10:35 AM CDT -----  Please make sure patient receives my chart message as below-Your bone density exam is very good your bones looks strong, no osteoporosis continue what ever you are doing-you can

## 2021-11-04 NOTE — TELEPHONE ENCOUNTER
Future appt:    Last Appointment with provider:  Yesterday, 11/3/2021 with [facility] for wellness exam    Last appointment at [facility]:  11/3/2021  Cholesterol, Total (mg/dL)   Date Value   10/29/2021 197     HDL Cholesterol (mg/dL)   Date Value   10/29/2

## 2021-11-05 ENCOUNTER — TELEPHONE (OUTPATIENT)
Dept: FAMILY MEDICINE CLINIC | Facility: CLINIC | Age: 66
End: 2021-11-05

## 2021-11-05 RX ORDER — VALACYCLOVIR HYDROCHLORIDE 500 MG/1
500 TABLET, FILM COATED ORAL 2 TIMES DAILY
Qty: 60 TABLET | Refills: 5 | Status: SHIPPED | OUTPATIENT
Start: 2021-11-05

## 2021-11-05 NOTE — TELEPHONE ENCOUNTER
Future appt:    Last Appointment with provider:   11/3/2021  Last appointment at [facility]:  11/3/2021    Valcyclovir refilled per EL on 11/321.   [facility] pharmacy sent fax requesting  Dosing frequency    Pt has hx: herpes simplex    Routed to Dr. Trinidad Sky

## 2021-11-08 ENCOUNTER — TELEPHONE (OUTPATIENT)
Dept: FAMILY MEDICINE CLINIC | Facility: CLINIC | Age: 66
End: 2021-11-08

## 2021-11-08 NOTE — TELEPHONE ENCOUNTER
Pt contacted. Pt states she received message that there is an issue with RX for Valacyclovir that was approved on 11 5/21  Pt states she needs medication for cold sores/ herpes outbreak  Pt has 6 tablets left.   Pt states she needs us to place a call to ma

## 2021-11-08 NOTE — TELEPHONE ENCOUNTER
[facility]- #[phone]  Contact below was a fax #. Spoke with Kobi Finely states pt Valacyclovir was shipped today for #180 (90 day supply). [id number]    LM for pt.

## 2022-04-14 ENCOUNTER — TELEPHONE (OUTPATIENT)
Dept: FAMILY MEDICINE CLINIC | Facility: CLINIC | Age: 67
End: 2022-04-14

## 2022-04-14 NOTE — TELEPHONE ENCOUNTER
pt is requesting that a rx for EZETIMIBE 10 MG  be sent to [facility]- does not want it to go to mail order - can get rx cheaper using good [facility] than using insurance - needs 90 day supply -  Would also like a call back to let her know that dr did send rx

## 2022-04-14 NOTE — TELEPHONE ENCOUNTER
Future appt:    Last Appointment with provider:   11/3/2021 for annual physical. Return in 1 year. Last appointment at [facility]:  11/3/2021  Cholesterol, Total (mg/dL)   Date Value   10/29/2021 197     HDL Cholesterol (mg/dL)   Date Value   10/29/2021 86 (H)     LDL Cholesterol (mg/dL)   Date Value   10/29/2021 102 (H)     Triglycerides (mg/dL)   Date Value   10/29/2021 49     Lab Results   Component Value Date     10/29/2021    A1C 5.7 (H) 10/29/2021     Lab Results   Component Value Date    T4F 0.8 10/29/2021    TSH 3.110 10/29/2021       No follow-ups on file.

## 2022-04-15 RX ORDER — EZETIMIBE 10 MG/1
10 TABLET ORAL NIGHTLY
Qty: 90 TABLET | Refills: 2 | Status: SHIPPED | OUTPATIENT
Start: 2022-04-15

## 2022-05-06 ENCOUNTER — TELEPHONE (OUTPATIENT)
Dept: FAMILY MEDICINE CLINIC | Facility: CLINIC | Age: 67
End: 2022-05-06

## 2022-09-15 ENCOUNTER — TELEPHONE (OUTPATIENT)
Dept: FAMILY MEDICINE CLINIC | Facility: CLINIC | Age: 67
End: 2022-09-15

## 2022-09-15 DIAGNOSIS — Z00.00 ENCOUNTER FOR ANNUAL HEALTH EXAMINATION: Primary | ICD-10-CM

## 2022-09-15 DIAGNOSIS — R73.9 BLOOD GLUCOSE ELEVATED: ICD-10-CM

## 2022-09-15 NOTE — TELEPHONE ENCOUNTER
Pt scheduled lab appt and px in November. Asking for lab orders to be placed. Please review and advise.

## 2022-11-09 ENCOUNTER — TELEPHONE (OUTPATIENT)
Dept: FAMILY MEDICINE CLINIC | Facility: CLINIC | Age: 67
End: 2022-11-09

## 2022-11-09 DIAGNOSIS — R92.8 ABNORMAL MAMMOGRAM OF LEFT BREAST: Primary | ICD-10-CM

## 2022-11-09 NOTE — TELEPHONE ENCOUNTER
Reviewed. Benign mammogram with the area of concern in the left breast being monitored. 6-month follow-up on left breast advised.   Order entererd

## 2022-11-10 ENCOUNTER — LABORATORY ENCOUNTER (OUTPATIENT)
Dept: LAB | Age: 67
End: 2022-11-10
Attending: FAMILY MEDICINE
Payer: MEDICARE

## 2022-11-10 DIAGNOSIS — R73.9 BLOOD GLUCOSE ELEVATED: ICD-10-CM

## 2022-11-10 DIAGNOSIS — Z00.00 ENCOUNTER FOR ANNUAL HEALTH EXAMINATION: ICD-10-CM

## 2022-11-10 LAB
ALBUMIN SERPL-MCNC: 4.1 G/DL (ref 3.4–5)
ALBUMIN/GLOB SERPL: 1.3 {RATIO} (ref 1–2)
ALP LIVER SERPL-CCNC: 59 U/L
ALT SERPL-CCNC: 20 U/L
ANION GAP SERPL CALC-SCNC: 5 MMOL/L (ref 0–18)
AST SERPL-CCNC: 12 U/L (ref 15–37)
BASOPHILS # BLD AUTO: 0.04 X10(3) UL (ref 0–0.2)
BASOPHILS NFR BLD AUTO: 0.7 %
BILIRUB SERPL-MCNC: 0.5 MG/DL (ref 0.1–2)
BILIRUB UR QL STRIP.AUTO: NEGATIVE
BUN BLD-MCNC: 26 MG/DL (ref 7–18)
CALCIUM BLD-MCNC: 8.8 MG/DL (ref 8.5–10.1)
CHLORIDE SERPL-SCNC: 104 MMOL/L (ref 98–112)
CHOLEST SERPL-MCNC: 210 MG/DL (ref ?–200)
CLARITY UR REFRACT.AUTO: CLEAR
CO2 SERPL-SCNC: 28 MMOL/L (ref 21–32)
COLOR UR AUTO: YELLOW
CREAT BLD-MCNC: 1.01 MG/DL
EOSINOPHIL # BLD AUTO: 0.12 X10(3) UL (ref 0–0.7)
EOSINOPHIL NFR BLD AUTO: 2.2 %
ERYTHROCYTE [DISTWIDTH] IN BLOOD BY AUTOMATED COUNT: 12 %
EST. AVERAGE GLUCOSE BLD GHB EST-MCNC: 117 MG/DL (ref 68–126)
FASTING PATIENT LIPID ANSWER: YES
FASTING STATUS PATIENT QL REPORTED: YES
GFR SERPLBLD BASED ON 1.73 SQ M-ARVRAT: 60 ML/MIN/1.73M2 (ref 60–?)
GLOBULIN PLAS-MCNC: 3.1 G/DL (ref 2.8–4.4)
GLUCOSE BLD-MCNC: 102 MG/DL (ref 70–99)
GLUCOSE UR STRIP.AUTO-MCNC: NEGATIVE MG/DL
HBA1C MFR BLD: 5.7 % (ref ?–5.7)
HCT VFR BLD AUTO: 43 %
HDLC SERPL-MCNC: 90 MG/DL (ref 40–59)
HGB BLD-MCNC: 14.6 G/DL
IMM GRANULOCYTES # BLD AUTO: 0.01 X10(3) UL (ref 0–1)
IMM GRANULOCYTES NFR BLD: 0.2 %
KETONES UR STRIP.AUTO-MCNC: NEGATIVE MG/DL
LDLC SERPL CALC-MCNC: 109 MG/DL (ref ?–100)
LEUKOCYTE ESTERASE UR QL STRIP.AUTO: NEGATIVE
LYMPHOCYTES # BLD AUTO: 2.14 X10(3) UL (ref 1–4)
LYMPHOCYTES NFR BLD AUTO: 39 %
MCH RBC QN AUTO: 33.7 PG (ref 26–34)
MCHC RBC AUTO-ENTMCNC: 34 G/DL (ref 31–37)
MCV RBC AUTO: 99.3 FL
MONOCYTES # BLD AUTO: 0.61 X10(3) UL (ref 0.1–1)
MONOCYTES NFR BLD AUTO: 11.1 %
NEUTROPHILS # BLD AUTO: 2.57 X10 (3) UL (ref 1.5–7.7)
NEUTROPHILS # BLD AUTO: 2.57 X10(3) UL (ref 1.5–7.7)
NEUTROPHILS NFR BLD AUTO: 46.8 %
NITRITE UR QL STRIP.AUTO: NEGATIVE
NONHDLC SERPL-MCNC: 120 MG/DL (ref ?–130)
OSMOLALITY SERPL CALC.SUM OF ELEC: 289 MOSM/KG (ref 275–295)
PH UR STRIP.AUTO: 6 [PH] (ref 5–8)
PLATELET # BLD AUTO: 277 10(3)UL (ref 150–450)
POTASSIUM SERPL-SCNC: 4.8 MMOL/L (ref 3.5–5.1)
PROT SERPL-MCNC: 7.2 G/DL (ref 6.4–8.2)
PROT UR STRIP.AUTO-MCNC: NEGATIVE MG/DL
RBC # BLD AUTO: 4.33 X10(6)UL
RBC UR QL AUTO: NEGATIVE
SODIUM SERPL-SCNC: 137 MMOL/L (ref 136–145)
SP GR UR STRIP.AUTO: 1.01 (ref 1–1.03)
TRIGL SERPL-MCNC: 63 MG/DL (ref 30–149)
TSI SER-ACNC: 2.34 MIU/ML (ref 0.36–3.74)
UROBILINOGEN UR STRIP.AUTO-MCNC: <2 MG/DL
VLDLC SERPL CALC-MCNC: 11 MG/DL (ref 0–30)
WBC # BLD AUTO: 5.5 X10(3) UL (ref 4–11)

## 2022-11-10 PROCEDURE — 85025 COMPLETE CBC W/AUTO DIFF WBC: CPT

## 2022-11-10 PROCEDURE — 84443 ASSAY THYROID STIM HORMONE: CPT

## 2022-11-10 PROCEDURE — 80053 COMPREHEN METABOLIC PANEL: CPT

## 2022-11-10 PROCEDURE — 83036 HEMOGLOBIN GLYCOSYLATED A1C: CPT

## 2022-11-10 PROCEDURE — 36415 COLL VENOUS BLD VENIPUNCTURE: CPT

## 2022-11-10 PROCEDURE — 80061 LIPID PANEL: CPT

## 2022-11-10 PROCEDURE — 81003 URINALYSIS AUTO W/O SCOPE: CPT

## 2022-11-14 ENCOUNTER — OFFICE VISIT (OUTPATIENT)
Dept: FAMILY MEDICINE CLINIC | Facility: CLINIC | Age: 67
End: 2022-11-14
Payer: MEDICARE

## 2022-11-14 VITALS
DIASTOLIC BLOOD PRESSURE: 70 MMHG | SYSTOLIC BLOOD PRESSURE: 156 MMHG | WEIGHT: 167.81 LBS | OXYGEN SATURATION: 99 % | RESPIRATION RATE: 20 BRPM | HEIGHT: 67.75 IN | TEMPERATURE: 98 F | BODY MASS INDEX: 25.73 KG/M2 | HEART RATE: 72 BPM

## 2022-11-14 DIAGNOSIS — Z13.31 DEPRESSION SCREENING: ICD-10-CM

## 2022-11-14 DIAGNOSIS — Z00.00 ENCOUNTER FOR ANNUAL HEALTH EXAMINATION: Primary | ICD-10-CM

## 2022-11-14 DIAGNOSIS — L30.9 DERMATITIS: ICD-10-CM

## 2022-11-14 DIAGNOSIS — E78.49 OTHER HYPERLIPIDEMIA: ICD-10-CM

## 2022-11-14 DIAGNOSIS — Z86.19 HX OF COLD SORES: ICD-10-CM

## 2022-11-14 DIAGNOSIS — L57.8 SUN-DAMAGED SKIN: ICD-10-CM

## 2022-11-14 RX ORDER — EZETIMIBE 10 MG/1
10 TABLET ORAL NIGHTLY
Qty: 90 TABLET | Refills: 3 | Status: SHIPPED | OUTPATIENT
Start: 2022-11-14

## 2022-11-14 NOTE — PATIENT INSTRUCTIONS
Consider vaccines--   Tetnus  Covid  Flu  Pneumonia  Shingle    Consider colon cancer screening    Continue walking, stretching      Continue medications      Ambrosio Vicente's SCREENING SCHEDULE   Tests on this list are recommended by your physician but may not be covered, or covered at this frequency, by your insurer. Please check with your insurance carrier before scheduling to verify coverage.    PREVENTATIVE SERVICES FREQUENCY &  COVERAGE DETAILS LAST COMPLETION DATE   Diabetes Screening    Fasting Blood Sugar /  Glucose    One screening every 12 months if never tested or if previously tested but not diagnosed with pre-diabetes   One screening every 6 months if diagnosed with pre-diabetes Lab Results   Component Value Date     (H) 11/10/2022        Cardiovascular Disease Screening    Lipid Panel  Cholesterol  Lipoprotein (HDL)  Triglycerides Covered every 5 years for all Medicare beneficiaries without apparent signs or symptoms of cardiovascular disease Lab Results   Component Value Date    CHOLEST 210 (H) 11/10/2022    HDL 90 (H) 11/10/2022     (H) 11/10/2022    TRIG 63 11/10/2022         Electrocardiogram (EKG)   Covered if needed at Welcome to Medicare, and non-screening if indicated for medical reasons 05/10/2019      Ultrasound Screening for Abdominal Aortic Aneurysm (AAA) Covered once in a lifetime for one of the following risk factors    Men who are 73-68 years old and have ever smoked    Anyone with a family history -     Colorectal Cancer Screening  Covered for ages 52-80; only need ONE of the following:    Colonoscopy   Covered every 10 years    Covered every 2 years if patient is at high risk or previous colonoscopy was abnormal 01/29/2015    Colorectal Cancer Screening due on 01/29/2020    Flexible Sigmoidoscopy   Covered every 4 years -    Fecal Occult Blood Test Covered annually -   Bone Density Screening    Bone density screening    Covered every 2 years after age 72 if diagnosed with risk of osteoporosis or estrogen deficiency. Covered yearly for long-term glucocorticoid medication use (Steroids) Last Dexa Scan:    XR DEXA BONE DENSITOMETRY (CPT=77080) 11/03/2021      No recommendations at this time   Pap and Pelvic    Pap   Covered every 2 years for women at normal risk;  Annually if at high risk -  No recommendations at this time    Chlamydia Annually if high risk -  No recommendations at this time   Screening Mammogram    Mammogram     Recommend annually for all female patients aged 36 and older    One baseline mammogram covered for patients aged 32-38 11/04/2022    Mammogram due on 05/04/2023    Immunizations    Influenza Covered once per flu season  Please get every year -  Influenza Vaccine(1) Never done    Pneumococcal Each vaccine ([id number] & [id number]) covered once after 72 Prevnar 13: -    Okbptyghq76: -     Pneumococcal Vaccination(1 - PCV) Never done    Hepatitis B One screening covered for patients with certain risk factors   -  No recommendations at this time    Tetanus Toxoid Not covered by Medicare Part B unless medically necessary (cut with metal); may be covered with your pharmacy prescription benefits -    Tetanus, Diptheria and Pertusis TD and TDaP Not covered by Medicare Part B -  No recommendations at this time    Zoster Not covered by Medicare Part B; may be covered with your pharmacy  prescription benefits -  Zoster Vaccines(1 of 2) Never done

## 2023-04-28 ENCOUNTER — TELEPHONE (OUTPATIENT)
Dept: FAMILY MEDICINE CLINIC | Facility: CLINIC | Age: 68
End: 2023-04-28

## 2023-04-28 NOTE — TELEPHONE ENCOUNTER
Future appt:    Last Appointment with provider:   11/14/2022- Geovanni Redding, pt advised then tor return in one year. Last appointment at [facility]:  11/14/2022    Ezetimibe was prescribed on 11/14/22 for one year to [facility]  Pt advised to have [address] request transfer. Pt agreed to do so. Cholesterol, Total (mg/dL)   Date Value   11/10/2022 210 (H)     HDL Cholesterol (mg/dL)   Date Value   11/10/2022 90 (H)     LDL Cholesterol (mg/dL)   Date Value   11/10/2022 109 (H)     Triglycerides (mg/dL)   Date Value   11/10/2022 63     Lab Results   Component Value Date     11/10/2022    A1C 5.7 (H) 11/10/2022     Lab Results   Component Value Date    T4F 0.8 10/29/2021    TSH 2.340 11/10/2022       No follow-ups on file.

## 2023-06-20 ENCOUNTER — TELEPHONE (OUTPATIENT)
Dept: FAMILY MEDICINE CLINIC | Facility: CLINIC | Age: 68
End: 2023-06-20

## 2023-06-20 DIAGNOSIS — E78.49 OTHER HYPERLIPIDEMIA: ICD-10-CM

## 2023-06-20 DIAGNOSIS — Z00.00 ENCOUNTER FOR ANNUAL HEALTH EXAMINATION: Primary | ICD-10-CM

## 2023-06-20 DIAGNOSIS — R73.9 BLOOD GLUCOSE ELEVATED: ICD-10-CM

## 2023-06-20 NOTE — TELEPHONE ENCOUNTER
Patient is having her medicare in November. She called today to make her lab appointment in October, please enter labs for Oct appt.

## 2023-06-20 NOTE — TELEPHONE ENCOUNTER
Last px:  11/14/22  Last lab drawn:  11/10/22        Future Appointments   Date Time Provider Bebeto Sheeba   10/30/2023  8:00 AM REF [facility]   11/7/2023 10:30 AM Kit Zuniga MD [facility] [place]

## 2023-08-02 ENCOUNTER — TELEPHONE (OUTPATIENT)
Dept: FAMILY MEDICINE CLINIC | Facility: CLINIC | Age: 68
End: 2023-08-02

## 2023-08-02 NOTE — TELEPHONE ENCOUNTER
Fax from [facility], [address]  for Clobetasol Ointment 0.05%  Last refilled May 2019    Pt contacted-  Pt goes to dermatology-  Pt states derm gave her foam, but pt would like ointment. Pt states she has an itchy scalp that sometimes will ooze-  Pt states she sees derm yearly. Pt advised to call dermatology for change in RX. Pt agreed to do so.

## 2023-10-03 NOTE — TELEPHONE ENCOUNTER
scheduled her lab appt. , need order from , Please let her know  Future Appointments   Date Time Provider Bebeto Aly   11/10/2022  9:00 AM REF [facility]   11/14/2022  9:30 AM Addie Jain MD [facility] [place] Additional Safety/Bands:

## (undated) DIAGNOSIS — R92.8 ABNORMAL MAMMOGRAM OF LEFT BREAST: Primary | ICD-10-CM